# Patient Record
Sex: FEMALE | Race: WHITE | NOT HISPANIC OR LATINO | URBAN - METROPOLITAN AREA
[De-identification: names, ages, dates, MRNs, and addresses within clinical notes are randomized per-mention and may not be internally consistent; named-entity substitution may affect disease eponyms.]

---

## 2023-06-30 ENCOUNTER — APPOINTMENT (OUTPATIENT)
Dept: URBAN - METROPOLITAN AREA SURGERY 17 | Age: 73
Setting detail: DERMATOLOGY
End: 2023-07-05

## 2023-06-30 VITALS
TEMPERATURE: 98.7 F | RESPIRATION RATE: 14 BRPM | SYSTOLIC BLOOD PRESSURE: 136 MMHG | HEART RATE: 66 BPM | DIASTOLIC BLOOD PRESSURE: 78 MMHG

## 2023-06-30 PROBLEM — C44.311 BASAL CELL CARCINOMA OF SKIN OF NOSE: Status: ACTIVE | Noted: 2023-06-30

## 2023-06-30 PROCEDURE — 17312 MOHS ADDL STAGE: CPT

## 2023-06-30 PROCEDURE — 17311 MOHS 1 STAGE H/N/HF/G: CPT

## 2023-06-30 PROCEDURE — OTHER MIPS QUALITY: OTHER

## 2023-06-30 PROCEDURE — OTHER MOHS SURGERY: OTHER

## 2023-06-30 PROCEDURE — OTHER CONSULTATION FOR MOHS SURGERY: OTHER

## 2023-06-30 NOTE — PROCEDURE: MOHS SURGERY

## 2023-06-30 NOTE — PROCEDURE: MOHS SURGERY

## 2023-06-30 NOTE — PROCEDURE: CONSULTATION FOR MOHS SURGERY
Detail Level: Detailed
Size Of Lesion: 0.9
Name Of The Referring Provider For Procedure: Sheryl Dc PA-C
Incorporate Mauc In Note: Yes

## 2023-06-30 NOTE — PROCEDURE: MOHS SURGERY
